# Patient Record
Sex: FEMALE | Race: OTHER | NOT HISPANIC OR LATINO | URBAN - METROPOLITAN AREA
[De-identification: names, ages, dates, MRNs, and addresses within clinical notes are randomized per-mention and may not be internally consistent; named-entity substitution may affect disease eponyms.]

---
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
## 2022-05-24 ENCOUNTER — APPOINTMENT (OUTPATIENT)
Dept: OTOLARYNGOLOGY | Facility: CLINIC | Age: 22
End: 2022-05-24
Payer: COMMERCIAL

## 2022-05-24 DIAGNOSIS — J34.89 OTHER SPECIFIED DISORDERS OF NOSE AND NASAL SINUSES: ICD-10-CM

## 2022-05-24 DIAGNOSIS — H61.23 IMPACTED CERUMEN, BILATERAL: ICD-10-CM

## 2022-05-24 PROCEDURE — 92550 TYMPANOMETRY & REFLEX THRESH: CPT

## 2022-05-24 PROCEDURE — 31231 NASAL ENDOSCOPY DX: CPT

## 2022-05-24 PROCEDURE — 92557 COMPREHENSIVE HEARING TEST: CPT

## 2022-05-24 PROCEDURE — 99214 OFFICE O/P EST MOD 30 MIN: CPT | Mod: 25

## 2022-05-24 RX ORDER — FLUTICASONE PROPIONATE 50 UG/1
50 SPRAY, METERED NASAL
Qty: 1 | Refills: 2 | Status: ACTIVE | COMMUNITY
Start: 2022-05-24 | End: 1900-01-01

## 2022-05-24 NOTE — PHYSICAL EXAM
[Normal] : mucosa is normal [Midline] : trachea located in midline position [de-identified] : bilateral cerumen impaction, removed with curette

## 2022-05-24 NOTE — HISTORY OF PRESENT ILLNESS
[FreeTextEntry1] : Patient presents today c/o clogged right ear.  patient states occasionally when she leans on or sleeps on right ear - it seems clogged and muffled hearing for just a few minutes .  She does not have history of ear infections.  She denies any pain or ear trauma.   She also c/o reoccurring sinus infections usually allergy related.  She gets sinus pressure and headaches and nasal congestion.

## 2022-05-24 NOTE — ASSESSMENT
[FreeTextEntry1] : I explained to the patient the pathophysiology of TMJ dysfunction, causing referred otalgia. I showed the impact of an  uneven bite/occlusion. \par During painful episodes, I recommended using slightly warm compresses to relieve the spasm of the masticators muscles, eating soft food, masticating on both sides of the jaw instead of one side,  in addition to using NSAIDs. I also explained the risks of side effects related to NSAIDs including stomach ulcers and recommended gastric protection while using NSAIDs. \par I also discussed the importance of seeing the dentist to align the bite to avoid a recurrence of the problem down the road.\par \par Wax cleaned.